# Patient Record
Sex: FEMALE | Race: WHITE | NOT HISPANIC OR LATINO
[De-identification: names, ages, dates, MRNs, and addresses within clinical notes are randomized per-mention and may not be internally consistent; named-entity substitution may affect disease eponyms.]

---

## 2017-02-28 ENCOUNTER — RESULT REVIEW (OUTPATIENT)
Age: 32
End: 2017-02-28

## 2017-03-01 ENCOUNTER — OUTPATIENT (OUTPATIENT)
Dept: OUTPATIENT SERVICES | Facility: HOSPITAL | Age: 32
LOS: 1 days | End: 2017-03-01
Payer: COMMERCIAL

## 2017-03-01 ENCOUNTER — APPOINTMENT (OUTPATIENT)
Dept: ULTRASOUND IMAGING | Facility: IMAGING CENTER | Age: 32
End: 2017-03-01

## 2017-03-01 DIAGNOSIS — R19.03 RIGHT LOWER QUADRANT ABDOMINAL SWELLING, MASS AND LUMP: ICD-10-CM

## 2017-03-09 ENCOUNTER — APPOINTMENT (OUTPATIENT)
Dept: MRI IMAGING | Facility: CLINIC | Age: 32
End: 2017-03-09

## 2017-03-09 ENCOUNTER — OUTPATIENT (OUTPATIENT)
Dept: OUTPATIENT SERVICES | Facility: HOSPITAL | Age: 32
LOS: 1 days | End: 2017-03-09
Payer: COMMERCIAL

## 2017-03-09 DIAGNOSIS — Z00.8 ENCOUNTER FOR OTHER GENERAL EXAMINATION: ICD-10-CM

## 2017-03-09 PROCEDURE — A9585: CPT

## 2017-03-09 PROCEDURE — 72197 MRI PELVIS W/O & W/DYE: CPT

## 2017-04-13 PROCEDURE — 76830 TRANSVAGINAL US NON-OB: CPT

## 2017-04-13 PROCEDURE — 76856 US EXAM PELVIC COMPLETE: CPT

## 2017-05-16 ENCOUNTER — NON-APPOINTMENT (OUTPATIENT)
Age: 32
End: 2017-05-16

## 2017-05-16 ENCOUNTER — APPOINTMENT (OUTPATIENT)
Dept: CARDIOLOGY | Facility: CLINIC | Age: 32
End: 2017-05-16

## 2017-05-16 VITALS
BODY MASS INDEX: 23.99 KG/M2 | SYSTOLIC BLOOD PRESSURE: 136 MMHG | HEART RATE: 106 BPM | OXYGEN SATURATION: 100 % | WEIGHT: 144 LBS | DIASTOLIC BLOOD PRESSURE: 84 MMHG | HEIGHT: 65 IN

## 2017-05-16 DIAGNOSIS — Z87.09 PERSONAL HISTORY OF OTHER DISEASES OF THE RESPIRATORY SYSTEM: ICD-10-CM

## 2017-05-31 ENCOUNTER — MEDICATION RENEWAL (OUTPATIENT)
Age: 32
End: 2017-05-31

## 2017-06-05 ENCOUNTER — OUTPATIENT (OUTPATIENT)
Dept: OUTPATIENT SERVICES | Facility: HOSPITAL | Age: 32
LOS: 1 days | End: 2017-06-05

## 2017-06-05 VITALS
RESPIRATION RATE: 16 BRPM | TEMPERATURE: 98 F | HEART RATE: 80 BPM | DIASTOLIC BLOOD PRESSURE: 78 MMHG | WEIGHT: 147.05 LBS | HEIGHT: 64.75 IN | SYSTOLIC BLOOD PRESSURE: 120 MMHG

## 2017-06-05 DIAGNOSIS — D25.9 LEIOMYOMA OF UTERUS, UNSPECIFIED: ICD-10-CM

## 2017-06-05 LAB
BLD GP AB SCN SERPL QL: NEGATIVE — SIGNIFICANT CHANGE UP
BUN SERPL-MCNC: 15 MG/DL — SIGNIFICANT CHANGE UP (ref 7–23)
CALCIUM SERPL-MCNC: 9.2 MG/DL — SIGNIFICANT CHANGE UP (ref 8.4–10.5)
CHLORIDE SERPL-SCNC: 104 MMOL/L — SIGNIFICANT CHANGE UP (ref 98–107)
CO2 SERPL-SCNC: 23 MMOL/L — SIGNIFICANT CHANGE UP (ref 22–31)
CREAT SERPL-MCNC: 0.55 MG/DL — SIGNIFICANT CHANGE UP (ref 0.5–1.3)
GLUCOSE SERPL-MCNC: 83 MG/DL — SIGNIFICANT CHANGE UP (ref 70–99)
HCT VFR BLD CALC: 43.3 % — SIGNIFICANT CHANGE UP (ref 34.5–45)
HGB BLD-MCNC: 14 G/DL — SIGNIFICANT CHANGE UP (ref 11.5–15.5)
MCHC RBC-ENTMCNC: 30.9 PG — SIGNIFICANT CHANGE UP (ref 27–34)
MCHC RBC-ENTMCNC: 32.3 % — SIGNIFICANT CHANGE UP (ref 32–36)
MCV RBC AUTO: 95.6 FL — SIGNIFICANT CHANGE UP (ref 80–100)
PLATELET # BLD AUTO: 275 K/UL — SIGNIFICANT CHANGE UP (ref 150–400)
PMV BLD: 10.4 FL — SIGNIFICANT CHANGE UP (ref 7–13)
POTASSIUM SERPL-MCNC: 4 MMOL/L — SIGNIFICANT CHANGE UP (ref 3.5–5.3)
POTASSIUM SERPL-SCNC: 4 MMOL/L — SIGNIFICANT CHANGE UP (ref 3.5–5.3)
RBC # BLD: 4.53 M/UL — SIGNIFICANT CHANGE UP (ref 3.8–5.2)
RBC # FLD: 12.8 % — SIGNIFICANT CHANGE UP (ref 10.3–14.5)
RH IG SCN BLD-IMP: NEGATIVE — SIGNIFICANT CHANGE UP
SODIUM SERPL-SCNC: 143 MMOL/L — SIGNIFICANT CHANGE UP (ref 135–145)
WBC # BLD: 6.46 K/UL — SIGNIFICANT CHANGE UP (ref 3.8–10.5)
WBC # FLD AUTO: 6.46 K/UL — SIGNIFICANT CHANGE UP (ref 3.8–10.5)

## 2017-06-05 RX ORDER — SODIUM CHLORIDE 9 MG/ML
1000 INJECTION, SOLUTION INTRAVENOUS
Qty: 0 | Refills: 0 | Status: DISCONTINUED | OUTPATIENT
Start: 2017-06-07 | End: 2017-06-08

## 2017-06-05 NOTE — H&P PST ADULT - MUSCULOSKELETAL
details… detailed exam no joint warmth/no joint swelling/ROM intact/normal strength/no calf tenderness/no joint erythema

## 2017-06-05 NOTE — H&P PST ADULT - NEGATIVE CARDIOVASCULAR SYMPTOMS
no orthopnea/no claudication/no dyspnea on exertion/no chest pain/no peripheral edema/no paroxysmal nocturnal dyspnea/no palpitations

## 2017-06-05 NOTE — H&P PST ADULT - LYMPHATIC
anterior cervical R/supraclavicular R/supraclavicular L/anterior cervical L/posterior cervical L/posterior cervical R

## 2017-06-05 NOTE — H&P PST ADULT - HISTORY OF PRESENT ILLNESS
32 yo female with no pertinent PMH presents to pre surgical testing.  Pt reports she went to GYN for a routine exam and uterine fibroid was palpated.  Pelvic sono and MRI done.  Pt is scheduled for exploratory laparotomy, abdominal myomectomy on 6/7/17. 32 yo female with no pertinent PMH presents to pre surgical testing.  Pt reports she went to GYN for a lower abdominal bloading and large uterine fibroid was palpated.  Pelvic sono and MRI done.  Pt is scheduled for exploratory laparotomy, abdominal myomectomy on 6/7/17. 30 yo female with no pertinent PMH presents to pre surgical testing.  Pt reports she went to GYN for a lower abdominal bloating and large uterine fibroid was palpated.  Pelvic sono and MRI done.  Pt is scheduled for exploratory laparotomy, abdominal myomectomy on 6/7/17.

## 2017-06-05 NOTE — H&P PST ADULT - NEGATIVE OPHTHALMOLOGIC SYMPTOMS
no blurred vision L/no lacrimation R/no lacrimation L/no pain L/no photophobia/no pain R/no diplopia

## 2017-06-05 NOTE — H&P PST ADULT - RS GEN PE MLT RESP DETAILS PC
no intercostal retractions/no subcutaneous emphysema/good air movement/breath sounds equal/no rhonchi/no rales/no chest wall tenderness/no wheezes/airway patent/respirations non-labored/clear to auscultation bilaterally

## 2017-06-05 NOTE — H&P PST ADULT - NSANTHOSAYNRD_GEN_A_CORE
No. SAMANTHA screening performed.  STOP BANG Legend: 0-2 = LOW Risk; 3-4 = INTERMEDIATE Risk; 5-8 = HIGH Risk

## 2017-06-05 NOTE — H&P PST ADULT - PROBLEM SELECTOR PLAN 1
Pt is scheduled for exploratory laparotomy, abdominal myomectomy on 6/7/17.   Pre op instructions including chlorhexidine wash given and pt able to verbalize understanding.   Sterile cup given, pt instructed to bring urine sample AM of surgery for UCG and pt able to verbalize understanding.

## 2017-06-05 NOTE — H&P PST ADULT - NEGATIVE MUSCULOSKELETAL SYMPTOMS
no myalgia/no arm pain R/no arthralgia/no muscle cramps/no neck pain/no stiffness/no leg pain R/no joint swelling/no back pain/no muscle weakness/no leg pain L/no arm pain L

## 2017-06-07 ENCOUNTER — INPATIENT (INPATIENT)
Facility: HOSPITAL | Age: 32
LOS: 1 days | Discharge: HOME | End: 2017-06-09
Attending: SPECIALIST | Admitting: SPECIALIST
Payer: COMMERCIAL

## 2017-06-07 ENCOUNTER — RESULT REVIEW (OUTPATIENT)
Age: 32
End: 2017-06-07

## 2017-06-07 VITALS
SYSTOLIC BLOOD PRESSURE: 127 MMHG | RESPIRATION RATE: 16 BRPM | DIASTOLIC BLOOD PRESSURE: 73 MMHG | HEART RATE: 84 BPM | TEMPERATURE: 99 F | HEIGHT: 64.75 IN | OXYGEN SATURATION: 99 % | WEIGHT: 147.05 LBS

## 2017-06-07 DIAGNOSIS — D25.9 LEIOMYOMA OF UTERUS, UNSPECIFIED: ICD-10-CM

## 2017-06-07 LAB
HCG UR-SCNC: NEGATIVE — SIGNIFICANT CHANGE UP
RH IG SCN BLD-IMP: NEGATIVE — SIGNIFICANT CHANGE UP
SP GR UR: 1.02 — SIGNIFICANT CHANGE UP (ref 1–1.03)

## 2017-06-07 PROCEDURE — 88305 TISSUE EXAM BY PATHOLOGIST: CPT | Mod: 26

## 2017-06-07 RX ORDER — OXYCODONE HYDROCHLORIDE 5 MG/1
5 TABLET ORAL
Qty: 0 | Refills: 0 | Status: DISCONTINUED | OUTPATIENT
Start: 2017-06-07 | End: 2017-06-09

## 2017-06-07 RX ORDER — ONDANSETRON 8 MG/1
4 TABLET, FILM COATED ORAL EVERY 4 HOURS
Qty: 0 | Refills: 0 | Status: DISCONTINUED | OUTPATIENT
Start: 2017-06-07 | End: 2017-06-09

## 2017-06-07 RX ORDER — IBUPROFEN 200 MG
600 TABLET ORAL EVERY 6 HOURS
Qty: 0 | Refills: 0 | Status: DISCONTINUED | OUTPATIENT
Start: 2017-06-07 | End: 2017-06-09

## 2017-06-07 RX ORDER — ONDANSETRON 8 MG/1
4 TABLET, FILM COATED ORAL ONCE
Qty: 0 | Refills: 0 | Status: DISCONTINUED | OUTPATIENT
Start: 2017-06-07 | End: 2017-06-09

## 2017-06-07 RX ORDER — SIMETHICONE 80 MG/1
80 TABLET, CHEWABLE ORAL
Qty: 0 | Refills: 0 | Status: DISCONTINUED | OUTPATIENT
Start: 2017-06-07 | End: 2017-06-09

## 2017-06-07 RX ORDER — FENTANYL CITRATE 50 UG/ML
50 INJECTION INTRAVENOUS
Qty: 0 | Refills: 0 | Status: DISCONTINUED | OUTPATIENT
Start: 2017-06-07 | End: 2017-06-09

## 2017-06-07 RX ORDER — OXYCODONE HYDROCHLORIDE 5 MG/1
5 TABLET ORAL EVERY 4 HOURS
Qty: 0 | Refills: 0 | Status: DISCONTINUED | OUTPATIENT
Start: 2017-06-07 | End: 2017-06-09

## 2017-06-07 RX ORDER — ACETAMINOPHEN 500 MG
975 TABLET ORAL EVERY 6 HOURS
Qty: 0 | Refills: 0 | Status: DISCONTINUED | OUTPATIENT
Start: 2017-06-07 | End: 2017-06-09

## 2017-06-07 RX ORDER — FENTANYL CITRATE 50 UG/ML
25 INJECTION INTRAVENOUS
Qty: 0 | Refills: 0 | Status: DISCONTINUED | OUTPATIENT
Start: 2017-06-07 | End: 2017-06-09

## 2017-06-07 RX ORDER — HYDROMORPHONE HYDROCHLORIDE 2 MG/ML
0.5 INJECTION INTRAMUSCULAR; INTRAVENOUS; SUBCUTANEOUS
Qty: 0 | Refills: 0 | Status: DISCONTINUED | OUTPATIENT
Start: 2017-06-07 | End: 2017-06-09

## 2017-06-07 RX ORDER — SODIUM CHLORIDE 9 MG/ML
1000 INJECTION, SOLUTION INTRAVENOUS
Qty: 0 | Refills: 0 | Status: DISCONTINUED | OUTPATIENT
Start: 2017-06-07 | End: 2017-06-08

## 2017-06-07 RX ADMIN — HYDROMORPHONE HYDROCHLORIDE 0.5 MILLIGRAM(S): 2 INJECTION INTRAMUSCULAR; INTRAVENOUS; SUBCUTANEOUS at 19:00

## 2017-06-07 RX ADMIN — SODIUM CHLORIDE 125 MILLILITER(S): 9 INJECTION, SOLUTION INTRAVENOUS at 19:30

## 2017-06-07 RX ADMIN — SODIUM CHLORIDE 30 MILLILITER(S): 9 INJECTION, SOLUTION INTRAVENOUS at 15:13

## 2017-06-07 RX ADMIN — HYDROMORPHONE HYDROCHLORIDE 0.5 MILLIGRAM(S): 2 INJECTION INTRAMUSCULAR; INTRAVENOUS; SUBCUTANEOUS at 19:15

## 2017-06-07 RX ADMIN — HYDROMORPHONE HYDROCHLORIDE 0.5 MILLIGRAM(S): 2 INJECTION INTRAMUSCULAR; INTRAVENOUS; SUBCUTANEOUS at 19:17

## 2017-06-07 RX ADMIN — OXYCODONE HYDROCHLORIDE 5 MILLIGRAM(S): 5 TABLET ORAL at 20:05

## 2017-06-07 RX ADMIN — Medication 600 MILLIGRAM(S): at 23:20

## 2017-06-07 RX ADMIN — OXYCODONE HYDROCHLORIDE 5 MILLIGRAM(S): 5 TABLET ORAL at 20:35

## 2017-06-07 RX ADMIN — Medication 600 MILLIGRAM(S): at 22:24

## 2017-06-07 RX ADMIN — HYDROMORPHONE HYDROCHLORIDE 0.5 MILLIGRAM(S): 2 INJECTION INTRAMUSCULAR; INTRAVENOUS; SUBCUTANEOUS at 18:59

## 2017-06-07 RX ADMIN — HYDROMORPHONE HYDROCHLORIDE 0.5 MILLIGRAM(S): 2 INJECTION INTRAMUSCULAR; INTRAVENOUS; SUBCUTANEOUS at 18:46

## 2017-06-07 RX ADMIN — HYDROMORPHONE HYDROCHLORIDE 0.5 MILLIGRAM(S): 2 INJECTION INTRAMUSCULAR; INTRAVENOUS; SUBCUTANEOUS at 19:30

## 2017-06-07 NOTE — BRIEF OPERATIVE NOTE - OPERATION/FINDINGS
15 cm pedunculated fundal myoma, 2cm submucosal fundal myoma. grossly normal fallopian tubes and ovaries.

## 2017-06-08 ENCOUNTER — TRANSCRIPTION ENCOUNTER (OUTPATIENT)
Age: 32
End: 2017-06-08

## 2017-06-08 LAB
BASOPHILS # BLD AUTO: 0.01 K/UL — SIGNIFICANT CHANGE UP (ref 0–0.2)
BASOPHILS NFR BLD AUTO: 0.1 % — SIGNIFICANT CHANGE UP (ref 0–2)
EOSINOPHIL # BLD AUTO: 0 K/UL — SIGNIFICANT CHANGE UP (ref 0–0.5)
EOSINOPHIL NFR BLD AUTO: 0 % — SIGNIFICANT CHANGE UP (ref 0–6)
HCT VFR BLD CALC: 40 % — SIGNIFICANT CHANGE UP (ref 34.5–45)
HGB BLD-MCNC: 12.6 G/DL — SIGNIFICANT CHANGE UP (ref 11.5–15.5)
IMM GRANULOCYTES NFR BLD AUTO: 0.3 % — SIGNIFICANT CHANGE UP (ref 0–1.5)
LYMPHOCYTES # BLD AUTO: 1.23 K/UL — SIGNIFICANT CHANGE UP (ref 1–3.3)
LYMPHOCYTES # BLD AUTO: 10.5 % — LOW (ref 13–44)
MCHC RBC-ENTMCNC: 30.2 PG — SIGNIFICANT CHANGE UP (ref 27–34)
MCHC RBC-ENTMCNC: 31.5 % — LOW (ref 32–36)
MCV RBC AUTO: 95.9 FL — SIGNIFICANT CHANGE UP (ref 80–100)
MONOCYTES # BLD AUTO: 0.78 K/UL — SIGNIFICANT CHANGE UP (ref 0–0.9)
MONOCYTES NFR BLD AUTO: 6.6 % — SIGNIFICANT CHANGE UP (ref 2–14)
NEUTROPHILS # BLD AUTO: 9.69 K/UL — HIGH (ref 1.8–7.4)
NEUTROPHILS NFR BLD AUTO: 82.5 % — HIGH (ref 43–77)
PLATELET # BLD AUTO: 238 K/UL — SIGNIFICANT CHANGE UP (ref 150–400)
PMV BLD: 10.1 FL — SIGNIFICANT CHANGE UP (ref 7–13)
RBC # BLD: 4.17 M/UL — SIGNIFICANT CHANGE UP (ref 3.8–5.2)
RBC # FLD: 12.7 % — SIGNIFICANT CHANGE UP (ref 10.3–14.5)
WBC # BLD: 11.74 K/UL — HIGH (ref 3.8–10.5)
WBC # FLD AUTO: 11.74 K/UL — HIGH (ref 3.8–10.5)

## 2017-06-08 RX ADMIN — OXYCODONE HYDROCHLORIDE 5 MILLIGRAM(S): 5 TABLET ORAL at 06:27

## 2017-06-08 RX ADMIN — Medication 600 MILLIGRAM(S): at 13:23

## 2017-06-08 RX ADMIN — Medication 975 MILLIGRAM(S): at 17:05

## 2017-06-08 RX ADMIN — Medication 600 MILLIGRAM(S): at 23:00

## 2017-06-08 RX ADMIN — Medication 600 MILLIGRAM(S): at 17:35

## 2017-06-08 RX ADMIN — OXYCODONE HYDROCHLORIDE 5 MILLIGRAM(S): 5 TABLET ORAL at 00:00

## 2017-06-08 RX ADMIN — SIMETHICONE 80 MILLIGRAM(S): 80 TABLET, CHEWABLE ORAL at 00:12

## 2017-06-08 RX ADMIN — Medication 600 MILLIGRAM(S): at 04:30

## 2017-06-08 RX ADMIN — SIMETHICONE 80 MILLIGRAM(S): 80 TABLET, CHEWABLE ORAL at 22:33

## 2017-06-08 RX ADMIN — SIMETHICONE 80 MILLIGRAM(S): 80 TABLET, CHEWABLE ORAL at 04:20

## 2017-06-08 RX ADMIN — OXYCODONE HYDROCHLORIDE 5 MILLIGRAM(S): 5 TABLET ORAL at 04:30

## 2017-06-08 RX ADMIN — SODIUM CHLORIDE 125 MILLILITER(S): 9 INJECTION, SOLUTION INTRAVENOUS at 00:12

## 2017-06-08 RX ADMIN — SIMETHICONE 80 MILLIGRAM(S): 80 TABLET, CHEWABLE ORAL at 19:14

## 2017-06-08 RX ADMIN — Medication 600 MILLIGRAM(S): at 03:38

## 2017-06-08 RX ADMIN — Medication 600 MILLIGRAM(S): at 22:36

## 2017-06-08 RX ADMIN — Medication 975 MILLIGRAM(S): at 07:20

## 2017-06-08 RX ADMIN — Medication 975 MILLIGRAM(S): at 00:02

## 2017-06-08 RX ADMIN — Medication 600 MILLIGRAM(S): at 12:53

## 2017-06-08 RX ADMIN — OXYCODONE HYDROCHLORIDE 5 MILLIGRAM(S): 5 TABLET ORAL at 09:21

## 2017-06-08 RX ADMIN — Medication 975 MILLIGRAM(S): at 22:36

## 2017-06-08 RX ADMIN — Medication 975 MILLIGRAM(S): at 01:00

## 2017-06-08 RX ADMIN — Medication 975 MILLIGRAM(S): at 23:00

## 2017-06-08 RX ADMIN — Medication 975 MILLIGRAM(S): at 12:53

## 2017-06-08 RX ADMIN — Medication 975 MILLIGRAM(S): at 17:35

## 2017-06-08 RX ADMIN — OXYCODONE HYDROCHLORIDE 5 MILLIGRAM(S): 5 TABLET ORAL at 03:38

## 2017-06-08 RX ADMIN — SIMETHICONE 80 MILLIGRAM(S): 80 TABLET, CHEWABLE ORAL at 12:02

## 2017-06-08 RX ADMIN — Medication 975 MILLIGRAM(S): at 06:27

## 2017-06-08 RX ADMIN — OXYCODONE HYDROCHLORIDE 5 MILLIGRAM(S): 5 TABLET ORAL at 00:02

## 2017-06-08 RX ADMIN — OXYCODONE HYDROCHLORIDE 5 MILLIGRAM(S): 5 TABLET ORAL at 07:20

## 2017-06-08 RX ADMIN — OXYCODONE HYDROCHLORIDE 5 MILLIGRAM(S): 5 TABLET ORAL at 01:00

## 2017-06-08 RX ADMIN — Medication 600 MILLIGRAM(S): at 17:06

## 2017-06-08 RX ADMIN — Medication 975 MILLIGRAM(S): at 13:23

## 2017-06-08 NOTE — PROGRESS NOTE ADULT - SUBJECTIVE AND OBJECTIVE BOX
Pt seen and examined at bedside. Pt states mild abdominal pain. Pt [] ambulating, tolerating regular diet, [+] flatus, [0]BM, [+] urinating adequately.   Pt denies fever, chills, chest pain, SOB, nausea, vomiting, lightheadedness, dizziness.      T(F): 98, Max: 98.6 (06-07 @ 14:51)  HR: 62 (62 - 113)  BP: 118/69 (101/76 - 133/89)  RR: 16 (12 - 18)  SpO2: 99% (97% - 99%)  Wt(kg): --  I&O's Summary  I & Os for 24h ending 08 Jun 2017 07:00  =============================================  IN: 1495 ml / OUT: 2425 ml / NET: -930 ml    I & Os for current day (as of 08 Jun 2017 10:00)  =============================================  IN: 0 ml / OUT: 250 ml / NET: -250 ml      MEDICATIONS  (STANDING):  acetaminophen   Tablet. 975milliGRAM(s) Oral every 6 hours  ibuprofen  Tablet 600milliGRAM(s) Oral every 6 hours  oxyCODONE IR 5milliGRAM(s) Oral every 3 hours    MEDICATIONS  (PRN):  oxyCODONE IR 5milliGRAM(s) Oral every 4 hours PRN Severe Pain (7 - 10)  simethicone 80milliGRAM(s) Chew four times a day PRN Gas  ondansetron Injectable 4milliGRAM(s) IV Push every 4 hours PRN Nausea and/or Vomiting  fentaNYL    Injectable 25MICROGram(s) IV Push every 5 minutes PRN Mild Pain (1 - 3)  fentaNYL    Injectable 50MICROGram(s) IV Push every 5 minutes PRN Moderate Pain (4 - 6)  HYDROmorphone  Injectable 0.5milliGRAM(s) IV Push every 10 minutes PRN Severe Pain (7 - 10)  ondansetron Injectable 4milliGRAM(s) IV Push once PRN Nausea and/or Vomiting      Physical Exam:  Constitutional: NAD  Pulmonary: clear to auscultation bilaterally   Cardiovascular: Regular rate and rhythm   Abdomen: incision site clean, dry, intact. Soft, mildly tender, [] distended, no guarding, no rebound, [] bowel sounds  Extremities: no lower extremity edema or calve tenderness. SCDs in place     LABS:                        12.6   11.74 )-----------( 238      ( 08 Jun 2017 04:10 )             40.0                 RADIOLOGY & ADDITIONAL TESTS:

## 2017-06-08 NOTE — PROGRESS NOTE ADULT - SUBJECTIVE AND OBJECTIVE BOX
POD#1   HD#2    Patient seen and examined at bedside, no acute overnight events. No acute complaints, pain well controlled.  Patient has not ambulated and is tolerating clears. Has not yet passed flatus. Carrion in place overnight. Denies CP, SOB, N/V, fevers, and chills.    Vital Signs Last 24 Hours  T(C): 36.7, Max: 37 (06-07 @ 14:51)  HR: 62 (62 - 113)  BP: 118/69 (101/76 - 133/89)  RR: 16 (12 - 18)  SpO2: 99% (97% - 99%)  Wt(kg): --    I&O's Summary    I & Os for current day (as of 08 Jun 2017 06:35)  =============================================  IN: 1495 ml / OUT: 2425 ml / NET: -930 ml      Physical Exam:  General: NAD  CV: RRR, S1, S2, no M/R/G  Lungs: CTAB  Abdomen: Soft, mildly tender, non-distended, +BS  Incision: midline vertical incision C/D/I  Ext: NTBL    Labs:                        12.6   11.74 )-----------( 238      ( 08 Jun 2017 04:10 )             40.0   baso 0.1    eos 0.0    imm gran 0.3    lymph 10.5   mono 6.6    poly 82.5                         14.0   6.46  )-----------( 275      ( 05 Jun 2017 07:20 )             43.3   baso x      eos x      imm gran x      lymph x      mono x      poly x          MEDICATIONS  (STANDING):  acetaminophen   Tablet. 975milliGRAM(s) Oral every 6 hours  ibuprofen  Tablet 600milliGRAM(s) Oral every 6 hours  oxyCODONE IR 5milliGRAM(s) Oral every 3 hours    MEDICATIONS  (PRN):  oxyCODONE IR 5milliGRAM(s) Oral every 4 hours PRN Severe Pain (7 - 10)  simethicone 80milliGRAM(s) Chew four times a day PRN Gas  ondansetron Injectable 4milliGRAM(s) IV Push every 4 hours PRN Nausea and/or Vomiting  fentaNYL    Injectable 25MICROGram(s) IV Push every 5 minutes PRN Mild Pain (1 - 3)  fentaNYL    Injectable 50MICROGram(s) IV Push every 5 minutes PRN Moderate Pain (4 - 6)  HYDROmorphone  Injectable 0.5milliGRAM(s) IV Push every 10 minutes PRN Severe Pain (7 - 10)  ondansetron Injectable 4milliGRAM(s) IV Push once PRN Nausea and/or Vomiting

## 2017-06-09 VITALS
SYSTOLIC BLOOD PRESSURE: 122 MMHG | RESPIRATION RATE: 18 BRPM | TEMPERATURE: 98 F | HEART RATE: 100 BPM | DIASTOLIC BLOOD PRESSURE: 66 MMHG | OXYGEN SATURATION: 99 %

## 2017-06-09 LAB
BASOPHILS # BLD AUTO: 0.01 K/UL — SIGNIFICANT CHANGE UP (ref 0–0.2)
BASOPHILS NFR BLD AUTO: 0.1 % — SIGNIFICANT CHANGE UP (ref 0–2)
EOSINOPHIL # BLD AUTO: 0.14 K/UL — SIGNIFICANT CHANGE UP (ref 0–0.5)
EOSINOPHIL NFR BLD AUTO: 1.8 % — SIGNIFICANT CHANGE UP (ref 0–6)
HCT VFR BLD CALC: 36.4 % — SIGNIFICANT CHANGE UP (ref 34.5–45)
HGB BLD-MCNC: 11.7 G/DL — SIGNIFICANT CHANGE UP (ref 11.5–15.5)
IMM GRANULOCYTES NFR BLD AUTO: 0.1 % — SIGNIFICANT CHANGE UP (ref 0–1.5)
LYMPHOCYTES # BLD AUTO: 2.04 K/UL — SIGNIFICANT CHANGE UP (ref 1–3.3)
LYMPHOCYTES # BLD AUTO: 26.8 % — SIGNIFICANT CHANGE UP (ref 13–44)
MCHC RBC-ENTMCNC: 31 PG — SIGNIFICANT CHANGE UP (ref 27–34)
MCHC RBC-ENTMCNC: 32.1 % — SIGNIFICANT CHANGE UP (ref 32–36)
MCV RBC AUTO: 96.6 FL — SIGNIFICANT CHANGE UP (ref 80–100)
MONOCYTES # BLD AUTO: 0.56 K/UL — SIGNIFICANT CHANGE UP (ref 0–0.9)
MONOCYTES NFR BLD AUTO: 7.4 % — SIGNIFICANT CHANGE UP (ref 2–14)
NEUTROPHILS # BLD AUTO: 4.84 K/UL — SIGNIFICANT CHANGE UP (ref 1.8–7.4)
NEUTROPHILS NFR BLD AUTO: 63.8 % — SIGNIFICANT CHANGE UP (ref 43–77)
PLATELET # BLD AUTO: 198 K/UL — SIGNIFICANT CHANGE UP (ref 150–400)
PMV BLD: 10 FL — SIGNIFICANT CHANGE UP (ref 7–13)
RBC # BLD: 3.77 M/UL — LOW (ref 3.8–5.2)
RBC # FLD: 12.8 % — SIGNIFICANT CHANGE UP (ref 10.3–14.5)
WBC # BLD: 7.6 K/UL — SIGNIFICANT CHANGE UP (ref 3.8–10.5)
WBC # FLD AUTO: 7.6 K/UL — SIGNIFICANT CHANGE UP (ref 3.8–10.5)

## 2017-06-09 RX ORDER — SENNA PLUS 8.6 MG/1
2 TABLET ORAL AT BEDTIME
Qty: 0 | Refills: 0 | Status: DISCONTINUED | OUTPATIENT
Start: 2017-06-09 | End: 2017-06-09

## 2017-06-09 RX ORDER — POLYETHYLENE GLYCOL 3350 17 G/17G
17 POWDER, FOR SOLUTION ORAL THREE TIMES A DAY
Qty: 0 | Refills: 0 | Status: DISCONTINUED | OUTPATIENT
Start: 2017-06-09 | End: 2017-06-09

## 2017-06-09 RX ORDER — OXYCODONE HYDROCHLORIDE 5 MG/1
1 TABLET ORAL
Qty: 30 | Refills: 0 | OUTPATIENT
Start: 2017-06-09

## 2017-06-09 RX ORDER — DOCUSATE SODIUM 100 MG
100 CAPSULE ORAL THREE TIMES A DAY
Qty: 0 | Refills: 0 | Status: DISCONTINUED | OUTPATIENT
Start: 2017-06-09 | End: 2017-06-09

## 2017-06-09 RX ADMIN — SIMETHICONE 80 MILLIGRAM(S): 80 TABLET, CHEWABLE ORAL at 12:25

## 2017-06-09 RX ADMIN — OXYCODONE HYDROCHLORIDE 5 MILLIGRAM(S): 5 TABLET ORAL at 06:37

## 2017-06-09 RX ADMIN — OXYCODONE HYDROCHLORIDE 5 MILLIGRAM(S): 5 TABLET ORAL at 16:25

## 2017-06-09 RX ADMIN — POLYETHYLENE GLYCOL 3350 17 GRAM(S): 17 POWDER, FOR SOLUTION ORAL at 14:57

## 2017-06-09 RX ADMIN — OXYCODONE HYDROCHLORIDE 5 MILLIGRAM(S): 5 TABLET ORAL at 06:53

## 2017-06-09 RX ADMIN — SIMETHICONE 80 MILLIGRAM(S): 80 TABLET, CHEWABLE ORAL at 05:26

## 2017-06-09 RX ADMIN — OXYCODONE HYDROCHLORIDE 5 MILLIGRAM(S): 5 TABLET ORAL at 12:25

## 2017-06-09 RX ADMIN — OXYCODONE HYDROCHLORIDE 5 MILLIGRAM(S): 5 TABLET ORAL at 06:38

## 2017-06-09 RX ADMIN — OXYCODONE HYDROCHLORIDE 5 MILLIGRAM(S): 5 TABLET ORAL at 15:55

## 2017-06-09 RX ADMIN — Medication 975 MILLIGRAM(S): at 05:26

## 2017-06-09 RX ADMIN — Medication 975 MILLIGRAM(S): at 06:34

## 2017-06-09 RX ADMIN — OXYCODONE HYDROCHLORIDE 5 MILLIGRAM(S): 5 TABLET ORAL at 10:19

## 2017-06-09 RX ADMIN — OXYCODONE HYDROCHLORIDE 5 MILLIGRAM(S): 5 TABLET ORAL at 12:55

## 2017-06-09 RX ADMIN — Medication 600 MILLIGRAM(S): at 06:34

## 2017-06-09 RX ADMIN — Medication 600 MILLIGRAM(S): at 05:26

## 2017-06-09 RX ADMIN — Medication 100 MILLIGRAM(S): at 14:57

## 2017-06-09 RX ADMIN — OXYCODONE HYDROCHLORIDE 5 MILLIGRAM(S): 5 TABLET ORAL at 09:49

## 2017-06-09 NOTE — PROGRESS NOTE ADULT - PROBLEM SELECTOR PLAN 1
Neuro: po pain meds for analgesia  CV: Hemodynamically stable. VS wnl.   Pulm: Saturating well on room air, encourage incentive spirometry and ambulation  GI: will advance to regular diet when patient passes flatus. continue clear liquid diet. mylicon for gas pain.   : UOP adequate, patterson removed at bedside, pt will be due to void  Heme: Of out bed and SCDs for DVT ppx. daily CBC. monitor for signs of bleeding or anemia.  ID: afebrile. trend WBC. monitor for signs/symptoms of infection.  Dispo: Continue routine post-op care
Neuro: po pain meds for analgesia.  CV: Hemodynamically stable. VS wnl.   Pulm: Saturating well on room air, encourage incentive spirometry and ambulation  GI: regular diet as tolerated. mylicon for gas pain. will add colace and senna.  : UOP adequate, pt freely voiding  Heme: Of out bed and SCDs for DVT ppx. daily CBC. monitor for signs of bleeding or anemia.  ID: afebrile. trend WBC. monitor for signs/symptoms of infection.  Dispo: Continue routine post-op care. discharge planning

## 2017-06-09 NOTE — DISCHARGE NOTE ADULT - MEDICATION SUMMARY - MEDICATIONS TO TAKE
I will START or STAY ON the medications listed below when I get home from the hospital:    oxyCODONE 5 mg oral tablet  -- 1 tab(s) by mouth every 6 hours, As Needed -for severe pain MDD:4  -- Caution federal law prohibits the transfer of this drug to any person other  than the person for whom it was prescribed.  It is very important that you take or use this exactly as directed.  Do not skip doses or discontinue unless directed by your doctor.  May cause drowsiness.  Alcohol may intensify this effect.  Use care when operating dangerous machinery.  This prescription cannot be refilled.  Using more of this medication than prescribed may cause serious breathing problems.    -- Indication: For pain I will START or STAY ON the medications listed below when I get home from the hospital:    oxyCODONE 5 mg oral tablet  -- 1 tab(s) by mouth every 6 hours  -- Indication: For Pain

## 2017-06-09 NOTE — DISCHARGE NOTE ADULT - MEDICATION SUMMARY - MEDICATIONS TO STOP TAKING
I will STOP taking the medications listed below when I get home from the hospital:    oxyCODONE 5 mg oral tablet  -- 1 tab(s) by mouth every 6 hours, As Needed -for severe pain MDD:4  -- Caution federal law prohibits the transfer of this drug to any person other  than the person for whom it was prescribed.  It is very important that you take or use this exactly as directed.  Do not skip doses or discontinue unless directed by your doctor.  May cause drowsiness.  Alcohol may intensify this effect.  Use care when operating dangerous machinery.  This prescription cannot be refilled.  Using more of this medication than prescribed may cause serious breathing problems. I will STOP taking the medications listed below when I get home from the hospital:  None

## 2017-06-09 NOTE — DISCHARGE NOTE ADULT - CARE PROVIDER_API CALL
Gabriel Henry (MD), Obstetrics and Gynecology  [address] [place] [address]  Phone: [phone]  Fax: [phone]

## 2017-06-09 NOTE — DISCHARGE NOTE ADULT - CARE PLAN
Principal Discharge DX:	Leiomyoma of uterus  Goal:	recovery  Instructions for follow-up, activity and diet:	nothing in vagina (sex, tampons, douching) no heavy lifting (>10 lbs) for 6 weeks. Please return to ER or call your doctors office if pain is worsening, you are unable to keep down food or drink, fever >101, heavy vaginal bleeding. You are able to take a shower regularly.

## 2017-06-09 NOTE — DISCHARGE NOTE ADULT - PATIENT PORTAL LINK FT
“You can access the [facility] Patient Portal, offered by [facility], by registering with the following website: [email or web address]”

## 2017-06-09 NOTE — PROGRESS NOTE ADULT - SUBJECTIVE AND OBJECTIVE BOX
GYN NOTE  POD#1  Patient seen resting comfortably.  No current complaints.  Denies HA, CP, SOB, dizziness, palpitations, worsening abdominal pain, worsening vaginal bleeding or any other concerns.  + Ambulation, + void without difficulty, + flatus and tolerating regular diet.     Vital Signs Last 24 Hrs  T(C): 36.3, Max: 36.8 (06-08 @ 17:59)  T(F): 97.3, Max: 98.2 (06-08 @ 17:59)  HR: 79 (71 - 92)  BP: 113/67 (94/54 - 120/70)  BP(mean): --  RR: 17 (16 - 18)  SpO2: 99% (98% - 100%)  CAPILLARY BLOOD GLUCOSE      Gen: A&O x3, NAD  Abdomen: Soft, nontender, +BS, nondistended, incisions clean/dry/intact  Gyn: No active bleeding  Extremities: Nontender, no worsening edema, venodynes intact bilaterally    CBC Full  -  ( 09 Jun 2017 04:40 )  WBC Count : 7.60 K/uL  Hemoglobin : 11.7 g/dL  Hematocrit : 36.4 %  Platelet Count - Automated : 198 K/uL  Mean Cell Volume : 96.6 fL  Mean Cell Hemoglobin : 31.0 pg  Mean Cell Hemoglobin Concentration : 32.1 %  Auto Neutrophil # : 4.84 K/uL  Auto Lymphocyte # : 2.04 K/uL  Auto Monocyte # : 0.56 K/uL  Auto Eosinophil # : 0.14 K/uL  Auto Basophil # : 0.01 K/uL  Auto Neutrophil % : 63.8 %  Auto Lymphocyte % : 26.8 %  Auto Monocyte % : 7.4 %  Auto Eosinophil % : 1.8 %  Auto Basophil % : 0.1 %              A/P: POD#2  -MEDICATIONS  (STANDING):  acetaminophen   Tablet. 975milliGRAM(s) Oral every 6 hours  ibuprofen  Tablet 600milliGRAM(s) Oral every 6 hours  oxyCODONE IR 5milliGRAM(s) Oral every 3 hours  docusate sodium 100milliGRAM(s) Oral three times a day  senna 2Tablet(s) Oral at bedtime    - Pain management prn  - Advance diet as per protocol  -OOB and ambulate    D/c home this pm  advised

## 2017-06-09 NOTE — DISCHARGE NOTE ADULT - HOSPITAL COURSE
30 y/o s/p Abdominal Myomectomy   2/2  Fibroids   EBL:50    HCT 43.3->40->36.4    .  See operative note for details of procedure.  POD#0, patient was advanced to a regular diet.  POD#1, patterson was discontinued and patient voided spontaneously.  Patient was discharged on POD#2 in stable condition with adequate pain control, ambulating, tolerating PO and voiding spontaneously.  Patient to follow up with Dr. Henry   in 2 weeks.

## 2017-06-09 NOTE — DISCHARGE NOTE ADULT - PLAN OF CARE
recovery nothing in vagina (sex, tampons, douching) no heavy lifting (>10 lbs) for 6 weeks. Please return to ER or call your doctors office if pain is worsening, you are unable to keep down food or drink, fever >101, heavy vaginal bleeding. You are able to take a shower regularly.

## 2017-06-09 NOTE — PROGRESS NOTE ADULT - SUBJECTIVE AND OBJECTIVE BOX
POD#2   HD#3    Patient seen and examined at bedside, no acute overnight events. Reporting gas pain this AM. Pt has been declining oxycodone although reports pain overnight.  Patient is ambulating and tolerating reg diet. +flatus. Freely voiding. Denies CP, SOB, N/V, fevers, and chills.    Vital Signs Last 24 Hours  T(C): 36.3, Max: 36.8 (06-08 @ 17:59)  HR: 79 (71 - 92)  BP: 113/67 (94/54 - 120/70)  RR: 17 (16 - 18)  SpO2: 99% (98% - 100%)  Wt(kg): --    I&O's Summary  I & Os for 24h ending 08 Jun 2017 07:00  =============================================  IN: 1495 ml / OUT: 2425 ml / NET: -930 ml    I & Os for current day (as of 09 Jun 2017 06:57)  =============================================  IN: 0 ml / OUT: 1450 ml / NET: -1450 ml      Physical Exam:  General: NAD  CV: RRR, S1, S2, no M/R/G  Lungs: CTAB  Abdomen: Soft, mildly tender, mildly distended, +BS  Incision: C/D/I midline vertical incision  Ext: NTBL    Labs:                        11.7   7.60  )-----------( 198      ( 09 Jun 2017 04:40 )             36.4   baso 0.1    eos 1.8    imm gran 0.1    lymph 26.8   mono 7.4    poly 63.8                         12.6   11.74 )-----------( 238      ( 08 Jun 2017 04:10 )             40.0   baso 0.1    eos 0.0    imm gran 0.3    lymph 10.5   mono 6.6    poly 82.5       MEDICATIONS  (STANDING):  acetaminophen   Tablet. 975milliGRAM(s) Oral every 6 hours  ibuprofen  Tablet 600milliGRAM(s) Oral every 6 hours  oxyCODONE IR 5milliGRAM(s) Oral every 3 hours    MEDICATIONS  (PRN):  oxyCODONE IR 5milliGRAM(s) Oral every 4 hours PRN Severe Pain (7 - 10)  simethicone 80milliGRAM(s) Chew four times a day PRN Gas  ondansetron Injectable 4milliGRAM(s) IV Push every 4 hours PRN Nausea and/or Vomiting  fentaNYL    Injectable 25MICROGram(s) IV Push every 5 minutes PRN Mild Pain (1 - 3)  fentaNYL    Injectable 50MICROGram(s) IV Push every 5 minutes PRN Moderate Pain (4 - 6)  HYDROmorphone  Injectable 0.5milliGRAM(s) IV Push every 10 minutes PRN Severe Pain (7 - 10)  ondansetron Injectable 4milliGRAM(s) IV Push once PRN Nausea and/or Vomiting

## 2017-06-15 LAB — SURGICAL PATHOLOGY STUDY: SIGNIFICANT CHANGE UP

## 2017-07-28 RX ORDER — ACETAMINOPHEN 500 MG
2 TABLET ORAL
Qty: 0 | Refills: 0 | COMMUNITY

## 2017-07-28 RX ORDER — OXYCODONE HYDROCHLORIDE 5 MG/1
1 TABLET ORAL
Qty: 0 | Refills: 0 | COMMUNITY

## 2017-07-28 RX ORDER — IBUPROFEN 200 MG
1 TABLET ORAL
Qty: 0 | Refills: 0 | COMMUNITY

## 2017-07-29 ENCOUNTER — RX RENEWAL (OUTPATIENT)
Age: 32
End: 2017-07-29

## 2017-08-01 ENCOUNTER — MEDICATION RENEWAL (OUTPATIENT)
Age: 32
End: 2017-08-01

## 2017-08-01 RX ORDER — ADHESIVE TAPE 3"X 2.3 YD
50 MCG TAPE, NON-MEDICATED TOPICAL
Qty: 90 | Refills: 3 | Status: ACTIVE | COMMUNITY
Start: 2017-08-01 | End: 1900-01-01

## 2018-03-02 ENCOUNTER — APPOINTMENT (OUTPATIENT)
Dept: ANTEPARTUM | Facility: CLINIC | Age: 33
End: 2018-03-02

## 2018-07-01 ENCOUNTER — OUTPATIENT (OUTPATIENT)
Dept: OUTPATIENT SERVICES | Facility: HOSPITAL | Age: 33
LOS: 1 days | End: 2018-07-01

## 2018-07-01 DIAGNOSIS — Z3A.00 WEEKS OF GESTATION OF PREGNANCY NOT SPECIFIED: ICD-10-CM

## 2018-07-01 DIAGNOSIS — O26.899 OTHER SPECIFIED PREGNANCY RELATED CONDITIONS, UNSPECIFIED TRIMESTER: ICD-10-CM

## 2018-07-02 RX ORDER — SODIUM CHLORIDE 9 MG/ML
1000 INJECTION, SOLUTION INTRAVENOUS
Qty: 0 | Refills: 0 | Status: DISCONTINUED | OUTPATIENT
Start: 2018-07-02 | End: 2018-07-16

## 2018-07-15 ENCOUNTER — TRANSCRIPTION ENCOUNTER (OUTPATIENT)
Age: 33
End: 2018-07-15

## 2018-07-16 ENCOUNTER — INPATIENT (INPATIENT)
Facility: HOSPITAL | Age: 33
LOS: 2 days | Discharge: HOME | End: 2018-07-19
Attending: SPECIALIST | Admitting: SPECIALIST

## 2018-07-16 ENCOUNTER — TRANSCRIPTION ENCOUNTER (OUTPATIENT)
Age: 33
End: 2018-07-16

## 2018-07-16 VITALS — HEIGHT: 65 IN | WEIGHT: 178.57 LBS

## 2018-07-16 DIAGNOSIS — Z3A.00 WEEKS OF GESTATION OF PREGNANCY NOT SPECIFIED: ICD-10-CM

## 2018-07-16 DIAGNOSIS — O42.10 PREMATURE RUPTURE OF MEMBRANES, ONSET OF LABOR MORE THAN 24 HOURS FOLLOWING RUPTURE, UNSPECIFIED WEEKS OF GESTATION: ICD-10-CM

## 2018-07-16 PROBLEM — D25.9 LEIOMYOMA OF UTERUS, UNSPECIFIED: Chronic | Status: ACTIVE | Noted: 2017-06-05

## 2018-07-16 LAB
BASOPHILS # BLD AUTO: 0.02 K/UL — SIGNIFICANT CHANGE UP (ref 0–0.2)
BASOPHILS NFR BLD AUTO: 0.2 % — SIGNIFICANT CHANGE UP (ref 0–2)
BLD GP AB SCN SERPL QL: NEGATIVE — SIGNIFICANT CHANGE UP
EOSINOPHIL # BLD AUTO: 0.04 K/UL — SIGNIFICANT CHANGE UP (ref 0–0.5)
EOSINOPHIL NFR BLD AUTO: 0.4 % — SIGNIFICANT CHANGE UP (ref 0–6)
HCT VFR BLD CALC: 36.7 % — SIGNIFICANT CHANGE UP (ref 34.5–45)
HGB BLD-MCNC: 12.3 G/DL — SIGNIFICANT CHANGE UP (ref 11.5–15.5)
IMM GRANULOCYTES # BLD AUTO: 0.07 # — SIGNIFICANT CHANGE UP
IMM GRANULOCYTES NFR BLD AUTO: 0.6 % — SIGNIFICANT CHANGE UP (ref 0–1.5)
LYMPHOCYTES # BLD AUTO: 1.24 K/UL — SIGNIFICANT CHANGE UP (ref 1–3.3)
LYMPHOCYTES # BLD AUTO: 11.2 % — LOW (ref 13–44)
MCHC RBC-ENTMCNC: 31.7 PG — SIGNIFICANT CHANGE UP (ref 27–34)
MCHC RBC-ENTMCNC: 33.5 % — SIGNIFICANT CHANGE UP (ref 32–36)
MCV RBC AUTO: 94.6 FL — SIGNIFICANT CHANGE UP (ref 80–100)
MONOCYTES # BLD AUTO: 0.82 K/UL — SIGNIFICANT CHANGE UP (ref 0–0.9)
MONOCYTES NFR BLD AUTO: 7.4 % — SIGNIFICANT CHANGE UP (ref 2–14)
NEUTROPHILS # BLD AUTO: 8.84 K/UL — HIGH (ref 1.8–7.4)
NEUTROPHILS NFR BLD AUTO: 80.2 % — HIGH (ref 43–77)
NRBC # FLD: 0 — SIGNIFICANT CHANGE UP
PLATELET # BLD AUTO: 162 K/UL — SIGNIFICANT CHANGE UP (ref 150–400)
PMV BLD: 10.5 FL — SIGNIFICANT CHANGE UP (ref 7–13)
RBC # BLD: 3.88 M/UL — SIGNIFICANT CHANGE UP (ref 3.8–5.2)
RBC # FLD: 14.4 % — SIGNIFICANT CHANGE UP (ref 10.3–14.5)
RH IG SCN BLD-IMP: NEGATIVE — SIGNIFICANT CHANGE UP
WBC # BLD: 11.03 K/UL — HIGH (ref 3.8–10.5)
WBC # FLD AUTO: 11.03 K/UL — HIGH (ref 3.8–10.5)

## 2018-07-16 RX ORDER — SODIUM CHLORIDE 9 MG/ML
1000 INJECTION, SOLUTION INTRAVENOUS ONCE
Qty: 0 | Refills: 0 | Status: COMPLETED | OUTPATIENT
Start: 2018-07-16 | End: 2018-07-16

## 2018-07-16 RX ORDER — OXYTOCIN 10 UNIT/ML
333.33 VIAL (ML) INJECTION
Qty: 20 | Refills: 0 | Status: DISCONTINUED | OUTPATIENT
Start: 2018-07-16 | End: 2018-07-17

## 2018-07-16 RX ORDER — FAMOTIDINE 10 MG/ML
20 INJECTION INTRAVENOUS ONCE
Qty: 0 | Refills: 0 | Status: COMPLETED | OUTPATIENT
Start: 2018-07-16 | End: 2018-07-16

## 2018-07-16 RX ORDER — SODIUM CHLORIDE 9 MG/ML
1000 INJECTION, SOLUTION INTRAVENOUS
Qty: 0 | Refills: 0 | Status: DISCONTINUED | OUTPATIENT
Start: 2018-07-16 | End: 2018-07-19

## 2018-07-16 RX ORDER — TETANUS TOXOID, REDUCED DIPHTHERIA TOXOID AND ACELLULAR PERTUSSIS VACCINE, ADSORBED 5; 2.5; 8; 8; 2.5 [IU]/.5ML; [IU]/.5ML; UG/.5ML; UG/.5ML; UG/.5ML
0.5 SUSPENSION INTRAMUSCULAR ONCE
Qty: 0 | Refills: 0 | Status: DISCONTINUED | OUTPATIENT
Start: 2018-07-16 | End: 2018-07-19

## 2018-07-16 RX ORDER — DOCUSATE SODIUM 100 MG
100 CAPSULE ORAL
Qty: 0 | Refills: 0 | Status: DISCONTINUED | OUTPATIENT
Start: 2018-07-16 | End: 2018-07-19

## 2018-07-16 RX ORDER — KETOROLAC TROMETHAMINE 30 MG/ML
30 SYRINGE (ML) INJECTION EVERY 6 HOURS
Qty: 0 | Refills: 0 | Status: DISCONTINUED | OUTPATIENT
Start: 2018-07-16 | End: 2018-07-17

## 2018-07-16 RX ORDER — OXYCODONE HYDROCHLORIDE 5 MG/1
5 TABLET ORAL
Qty: 0 | Refills: 0 | Status: DISCONTINUED | OUTPATIENT
Start: 2018-07-16 | End: 2018-07-16

## 2018-07-16 RX ORDER — OXYCODONE HYDROCHLORIDE 5 MG/1
1 TABLET ORAL
Qty: 0 | Refills: 0 | COMMUNITY

## 2018-07-16 RX ORDER — SODIUM CHLORIDE 9 MG/ML
1000 INJECTION, SOLUTION INTRAVENOUS
Qty: 0 | Refills: 0 | Status: DISCONTINUED | OUTPATIENT
Start: 2018-07-16 | End: 2018-07-16

## 2018-07-16 RX ORDER — OXYCODONE HYDROCHLORIDE 5 MG/1
5 TABLET ORAL
Qty: 0 | Refills: 0 | Status: DISCONTINUED | OUTPATIENT
Start: 2018-07-16 | End: 2018-07-19

## 2018-07-16 RX ORDER — DIPHENHYDRAMINE HCL 50 MG
25 CAPSULE ORAL EVERY 6 HOURS
Qty: 0 | Refills: 0 | Status: DISCONTINUED | OUTPATIENT
Start: 2018-07-16 | End: 2018-07-19

## 2018-07-16 RX ORDER — GLYCERIN ADULT
1 SUPPOSITORY, RECTAL RECTAL AT BEDTIME
Qty: 0 | Refills: 0 | Status: DISCONTINUED | OUTPATIENT
Start: 2018-07-16 | End: 2018-07-19

## 2018-07-16 RX ORDER — LANOLIN
1 OINTMENT (GRAM) TOPICAL
Qty: 0 | Refills: 0 | Status: DISCONTINUED | OUTPATIENT
Start: 2018-07-16 | End: 2018-07-19

## 2018-07-16 RX ORDER — OXYCODONE HYDROCHLORIDE 5 MG/1
5 TABLET ORAL EVERY 4 HOURS
Qty: 0 | Refills: 0 | Status: DISCONTINUED | OUTPATIENT
Start: 2018-07-16 | End: 2018-07-19

## 2018-07-16 RX ORDER — METOCLOPRAMIDE HCL 10 MG
10 TABLET ORAL ONCE
Qty: 0 | Refills: 0 | Status: COMPLETED | OUTPATIENT
Start: 2018-07-16 | End: 2018-07-16

## 2018-07-16 RX ORDER — FERROUS SULFATE 325(65) MG
325 TABLET ORAL DAILY
Qty: 0 | Refills: 0 | Status: DISCONTINUED | OUTPATIENT
Start: 2018-07-16 | End: 2018-07-19

## 2018-07-16 RX ORDER — ACETAMINOPHEN 500 MG
975 TABLET ORAL EVERY 6 HOURS
Qty: 0 | Refills: 0 | Status: DISCONTINUED | OUTPATIENT
Start: 2018-07-16 | End: 2018-07-19

## 2018-07-16 RX ORDER — CITRIC ACID/SODIUM CITRATE 300-500 MG
30 SOLUTION, ORAL ORAL ONCE
Qty: 0 | Refills: 0 | Status: COMPLETED | OUTPATIENT
Start: 2018-07-16 | End: 2018-07-16

## 2018-07-16 RX ORDER — SIMETHICONE 80 MG/1
80 TABLET, CHEWABLE ORAL EVERY 4 HOURS
Qty: 0 | Refills: 0 | Status: DISCONTINUED | OUTPATIENT
Start: 2018-07-16 | End: 2018-07-19

## 2018-07-16 RX ORDER — OXYTOCIN 10 UNIT/ML
41.67 VIAL (ML) INJECTION
Qty: 20 | Refills: 0 | Status: DISCONTINUED | OUTPATIENT
Start: 2018-07-16 | End: 2018-07-17

## 2018-07-16 RX ORDER — OXYTOCIN 10 UNIT/ML
41.67 VIAL (ML) INJECTION
Qty: 20 | Refills: 0 | Status: DISCONTINUED | OUTPATIENT
Start: 2018-07-16 | End: 2018-07-16

## 2018-07-16 RX ORDER — HEPARIN SODIUM 5000 [USP'U]/ML
5000 INJECTION INTRAVENOUS; SUBCUTANEOUS EVERY 12 HOURS
Qty: 0 | Refills: 0 | Status: DISCONTINUED | OUTPATIENT
Start: 2018-07-16 | End: 2018-07-19

## 2018-07-16 RX ORDER — SODIUM CHLORIDE 9 MG/ML
500 INJECTION, SOLUTION INTRAVENOUS ONCE
Qty: 0 | Refills: 0 | Status: COMPLETED | OUTPATIENT
Start: 2018-07-16 | End: 2018-07-16

## 2018-07-16 RX ORDER — IBUPROFEN 200 MG
600 TABLET ORAL EVERY 6 HOURS
Qty: 0 | Refills: 0 | Status: COMPLETED | OUTPATIENT
Start: 2018-07-16 | End: 2019-06-14

## 2018-07-16 RX ORDER — OXYTOCIN 10 UNIT/ML
333.33 VIAL (ML) INJECTION
Qty: 20 | Refills: 0 | Status: COMPLETED | OUTPATIENT
Start: 2018-07-16

## 2018-07-16 RX ADMIN — SODIUM CHLORIDE 2000 MILLILITER(S): 9 INJECTION, SOLUTION INTRAVENOUS at 16:23

## 2018-07-16 RX ADMIN — Medication 125 MILLIUNIT(S)/MIN: at 22:00

## 2018-07-16 RX ADMIN — Medication 975 MILLIGRAM(S): at 22:45

## 2018-07-16 RX ADMIN — SODIUM CHLORIDE 1000 MILLILITER(S): 9 INJECTION, SOLUTION INTRAVENOUS at 08:38

## 2018-07-16 RX ADMIN — FAMOTIDINE 20 MILLIGRAM(S): 10 INJECTION INTRAVENOUS at 16:22

## 2018-07-16 RX ADMIN — Medication 975 MILLIGRAM(S): at 21:56

## 2018-07-16 RX ADMIN — SIMETHICONE 80 MILLIGRAM(S): 80 TABLET, CHEWABLE ORAL at 22:35

## 2018-07-16 RX ADMIN — SODIUM CHLORIDE 250 MILLILITER(S): 9 INJECTION, SOLUTION INTRAVENOUS at 04:14

## 2018-07-16 RX ADMIN — HEPARIN SODIUM 5000 UNIT(S): 5000 INJECTION INTRAVENOUS; SUBCUTANEOUS at 21:56

## 2018-07-16 RX ADMIN — Medication 30 MILLILITER(S): at 16:22

## 2018-07-16 RX ADMIN — SODIUM CHLORIDE 2000 MILLILITER(S): 9 INJECTION, SOLUTION INTRAVENOUS at 02:45

## 2018-07-16 RX ADMIN — Medication 10 MILLIGRAM(S): at 16:23

## 2018-07-16 NOTE — DISCHARGE NOTE OB - CARE PROVIDER_API CALL
Gabriel Henry), Obstetrics and Gynecology  [address] [place] [address]  Phone: [phone]  Fax: [phone]

## 2018-07-16 NOTE — DISCHARGE NOTE OB - CARE PLAN
Principal Discharge DX:	 delivery delivered  Goal:	discharge 18  Assessment and plan of treatment:	follow up in 1 week

## 2018-07-16 NOTE — DISCHARGE NOTE OB - MEDICATION SUMMARY - MEDICATIONS TO TAKE

## 2018-07-16 NOTE — DISCHARGE NOTE OB - PATIENT PORTAL LINK FT
You can access the [facility] Portal, offered by [facility], by registering with the following website: [email or web address]

## 2018-07-17 LAB
BASOPHILS # BLD AUTO: 0.03 K/UL — SIGNIFICANT CHANGE UP (ref 0–0.2)
BASOPHILS NFR BLD AUTO: 0.2 % — SIGNIFICANT CHANGE UP (ref 0–2)
EOSINOPHIL # BLD AUTO: 0.06 K/UL — SIGNIFICANT CHANGE UP (ref 0–0.5)
EOSINOPHIL NFR BLD AUTO: 0.5 % — SIGNIFICANT CHANGE UP (ref 0–6)
HCT VFR BLD CALC: 32.6 % — LOW (ref 34.5–45)
HGB BLD-MCNC: 10.6 G/DL — LOW (ref 11.5–15.5)
IMM GRANULOCYTES # BLD AUTO: 0.08 # — SIGNIFICANT CHANGE UP
IMM GRANULOCYTES NFR BLD AUTO: 0.7 % — SIGNIFICANT CHANGE UP (ref 0–1.5)
LYMPHOCYTES # BLD AUTO: 1.25 K/UL — SIGNIFICANT CHANGE UP (ref 1–3.3)
LYMPHOCYTES # BLD AUTO: 10.3 % — LOW (ref 13–44)
MCHC RBC-ENTMCNC: 31.3 PG — SIGNIFICANT CHANGE UP (ref 27–34)
MCHC RBC-ENTMCNC: 32.5 % — SIGNIFICANT CHANGE UP (ref 32–36)
MCV RBC AUTO: 96.2 FL — SIGNIFICANT CHANGE UP (ref 80–100)
MONOCYTES # BLD AUTO: 0.67 K/UL — SIGNIFICANT CHANGE UP (ref 0–0.9)
MONOCYTES NFR BLD AUTO: 5.5 % — SIGNIFICANT CHANGE UP (ref 2–14)
NEUTROPHILS # BLD AUTO: 10.05 K/UL — HIGH (ref 1.8–7.4)
NEUTROPHILS NFR BLD AUTO: 82.8 % — HIGH (ref 43–77)
NRBC # FLD: 0 — SIGNIFICANT CHANGE UP
PLATELET # BLD AUTO: 140 K/UL — LOW (ref 150–400)
PMV BLD: 10.5 FL — SIGNIFICANT CHANGE UP (ref 7–13)
RBC # BLD: 3.39 M/UL — LOW (ref 3.8–5.2)
RBC # FLD: 14.4 % — SIGNIFICANT CHANGE UP (ref 10.3–14.5)
T PALLIDUM AB TITR SER: NEGATIVE — SIGNIFICANT CHANGE UP
WBC # BLD: 12.14 K/UL — HIGH (ref 3.8–10.5)
WBC # FLD AUTO: 12.14 K/UL — HIGH (ref 3.8–10.5)

## 2018-07-17 RX ORDER — IBUPROFEN 200 MG
600 TABLET ORAL EVERY 6 HOURS
Qty: 0 | Refills: 0 | Status: DISCONTINUED | OUTPATIENT
Start: 2018-07-17 | End: 2018-07-19

## 2018-07-17 RX ADMIN — Medication 975 MILLIGRAM(S): at 14:26

## 2018-07-17 RX ADMIN — Medication 30 MILLIGRAM(S): at 14:26

## 2018-07-17 RX ADMIN — Medication 30 MILLIGRAM(S): at 21:15

## 2018-07-17 RX ADMIN — Medication 30 MILLIGRAM(S): at 20:27

## 2018-07-17 RX ADMIN — Medication 325 MILLIGRAM(S): at 14:26

## 2018-07-17 RX ADMIN — Medication 975 MILLIGRAM(S): at 02:52

## 2018-07-17 RX ADMIN — SIMETHICONE 80 MILLIGRAM(S): 80 TABLET, CHEWABLE ORAL at 08:41

## 2018-07-17 RX ADMIN — Medication 975 MILLIGRAM(S): at 20:27

## 2018-07-17 RX ADMIN — HEPARIN SODIUM 5000 UNIT(S): 5000 INJECTION INTRAVENOUS; SUBCUTANEOUS at 10:08

## 2018-07-17 RX ADMIN — Medication 975 MILLIGRAM(S): at 09:07

## 2018-07-17 RX ADMIN — Medication 1 TABLET(S): at 14:26

## 2018-07-17 RX ADMIN — HEPARIN SODIUM 5000 UNIT(S): 5000 INJECTION INTRAVENOUS; SUBCUTANEOUS at 21:36

## 2018-07-17 RX ADMIN — Medication 30 MILLIGRAM(S): at 02:51

## 2018-07-17 RX ADMIN — SIMETHICONE 80 MILLIGRAM(S): 80 TABLET, CHEWABLE ORAL at 03:15

## 2018-07-17 RX ADMIN — SODIUM CHLORIDE 125 MILLILITER(S): 9 INJECTION, SOLUTION INTRAVENOUS at 02:16

## 2018-07-17 RX ADMIN — Medication 30 MILLIGRAM(S): at 09:08

## 2018-07-17 RX ADMIN — Medication 975 MILLIGRAM(S): at 08:32

## 2018-07-17 RX ADMIN — Medication 30 MILLIGRAM(S): at 03:45

## 2018-07-17 RX ADMIN — Medication 100 MILLIGRAM(S): at 21:36

## 2018-07-17 RX ADMIN — Medication 30 MILLIGRAM(S): at 08:32

## 2018-07-17 RX ADMIN — Medication 975 MILLIGRAM(S): at 21:15

## 2018-07-17 RX ADMIN — Medication 975 MILLIGRAM(S): at 03:45

## 2018-07-17 NOTE — PROGRESS NOTE ADULT - SUBJECTIVE AND OBJECTIVE BOX
OB Progress Note:  Delivery, POD#1    S: 31yo POD#1 s/p LTCS . Her pain is well controlled. She is tolerating a regular diet and passing flatus. Denies N/V. Denies CP/SOB/lightheadedness/dizziness.   She has been minimally ambulating in room without difficulty.      O:   Vital Signs Last 24 Hrs  T(C): 36.7 (2018 06:09), Max: 37.3 (2018 22:00)  T(F): 98 (2018 06:09), Max: 99.1 (2018 22:00)  HR: 88 (2018 06:09) (88 - 109)  BP: 108/70 (2018 06:09) (104/61 - 126/69)  BP(mean): 77 (2018 20:15) (65 - 85)  RR: 16 (2018 06:09) (14 - 23)  SpO2: 96% (2018 06:09) (96% - 98%)    Labs:  Blood type: O Negative  Rubella IgG: RPR: Negative                          10.6<L>   12.14<H> >-----------< 140<L>    (  @ 06:30 )             32.6<L>                        12.3   11.03<H> >-----------< 162    (  @ 02:30 )             36.7                  PE:  General: NAD  Abdomen: Mildly distended, appropriately tender, incision c/d/i.  Extremities: No erythema, no pitting edema

## 2018-07-17 NOTE — CONSULT NOTE ADULT - SUBJECTIVE AND OBJECTIVE BOX
Subjective:  The patient feels well.  She is ambulating.   She is tolerating regular diet.  She denies nausea and vomiting.  She is voiding.  Her pain is controlled.  She reports normal postpartum bleeding.      Objective:  Vital Signs Last 24 Hrs  T(C): 36.7 (2018 06:09), Max: 37.3 (2018 22:00)  T(F): 98 (2018 06:09), Max: 99.1 (2018 22:00)  HR: 88 (2018 06:09) (88 - 109)  BP: 108/70 (2018 06:09) (104/61 - 126/69)  BP(mean): 77 (2018 20:15) (65 - 85)  RR: 16 (2018 06:09) (14 - 23)  SpO2: 96% (2018 06:09) (96% - 98%)    Constitutional: NAD  Breast: Soft, nontender, not engorged.  Abdomen: Soft, nontender, no distension, firm uterine fundus  Incision: Clean, dry, and intact  Pelvic: Normal lochia noted  Ext: No calf tenderness bilaterally    LABS:                        10.6   12.14 )-----------( 140      ( 2018 06:30 )             32.6                         12.3   11.03 )-----------( 162      ( 2018 02:30 )             36.7         Allergies    No Known Allergies    Intolerances      MEDICATIONS  (STANDING):  acetaminophen   Tablet. 975 milliGRAM(s) Oral every 6 hours  diphtheria/tetanus/pertussis (acellular) Vaccine (ADAcel) 0.5 milliLiter(s) IntraMuscular once  ferrous    sulfate 325 milliGRAM(s) Oral daily  heparin  Injectable 5000 Unit(s) SubCutaneous every 12 hours  ibuprofen  Tablet 600 milliGRAM(s) Oral every 6 hours  ketorolac   Injectable 30 milliGRAM(s) IV Push every 6 hours  lactated ringers. 1000 milliLiter(s) (125 mL/Hr) IV Continuous <Continuous>  oxyCODONE    IR 5 milliGRAM(s) Oral every 3 hours  oxytocin Infusion 333.333 milliUNIT(s)/Min (1000 mL/Hr) IV Continuous <Continuous>  oxytocin Infusion 41.667 milliUNIT(s)/Min (125 mL/Hr) IV Continuous <Continuous>  oxytocin Infusion 333.333 milliUNIT(s)/Min (1000 mL/Hr) IV Continuous <Continuous>  prenatal multivitamin 1 Tablet(s) Oral daily    MEDICATIONS  (PRN):  diphenhydrAMINE   Capsule 25 milliGRAM(s) Oral every 6 hours PRN Itching  docusate sodium 100 milliGRAM(s) Oral two times a day PRN Stool Softening  glycerin Suppository - Adult 1 Suppository(s) Rectal at bedtime PRN Constipation  lanolin Ointment 1 Application(s) Topical every 3 hours PRN Sore Nipples  oxyCODONE    IR 5 milliGRAM(s) Oral every 3 hours PRN Mild Pain  oxyCODONE    IR 5 milliGRAM(s) Oral every 4 hours PRN Severe Pain (7 - 10)  simethicone 80 milliGRAM(s) Chew every 4 hours PRN Gas        Assessment and Plan  Pt stable POD # s/p1  section  -continues postoperative and postpartum care  -encourage ambulation

## 2018-07-17 NOTE — PROGRESS NOTE ADULT - PROBLEM SELECTOR PLAN 1
- Continue regular diet.  - Increase ambulation.  - Continue motrin, tylenol, oxycodone PRN for pain control.   - AM CBC stable     Kerline Nallely PGY-2

## 2018-07-17 NOTE — PROVIDER CONTACT NOTE (OTHER) - ACTION/TREATMENT ORDERED:
Per Anesthsia, pt can be transferred to PP unit prior to sign out.
NO new orders at present. Will continue to monitor.

## 2018-07-17 NOTE — PROGRESS NOTE ADULT - SUBJECTIVE AND OBJECTIVE BOX
INTERVAL HPI/OVERNIGHT EVENTS:  32y Female s/p c section under epidural anesthesia with duramorph for post op analgesia on     Vital Signs Last 24 Hrs  T(C): 36.7 (17 Jul 2018 06:09), Max: 37.3 (16 Jul 2018 22:00)  T(F): 98 (17 Jul 2018 06:09), Max: 99.1 (16 Jul 2018 22:00)  HR: 88 (17 Jul 2018 06:09) (88 - 109)  BP: 108/70 (17 Jul 2018 06:09) (104/61 - 126/69)  BP(mean): 77 (16 Jul 2018 20:15) (65 - 85)  RR: 16 (17 Jul 2018 06:09) (14 - 23)  SpO2: 96% (17 Jul 2018 06:09) (96% - 98%)    Patient seen 07:47, doing well, no anesthetic complications or complaints noted or reported.  Pain is controlled.

## 2018-07-17 NOTE — PROVIDER CONTACT NOTE (OTHER) - ASSESSMENT
VSS, afebrile.  Pt is oob to chair. Denies pain 0/10 on pain scale.  Incision is open to air with SS.

## 2018-07-18 RX ADMIN — Medication 600 MILLIGRAM(S): at 22:57

## 2018-07-18 RX ADMIN — Medication 975 MILLIGRAM(S): at 23:48

## 2018-07-18 RX ADMIN — SIMETHICONE 80 MILLIGRAM(S): 80 TABLET, CHEWABLE ORAL at 11:02

## 2018-07-18 RX ADMIN — Medication 600 MILLIGRAM(S): at 11:02

## 2018-07-18 RX ADMIN — Medication 975 MILLIGRAM(S): at 22:56

## 2018-07-18 RX ADMIN — Medication 975 MILLIGRAM(S): at 12:00

## 2018-07-18 RX ADMIN — Medication 100 MILLIGRAM(S): at 11:02

## 2018-07-18 RX ADMIN — Medication 975 MILLIGRAM(S): at 11:02

## 2018-07-18 RX ADMIN — Medication 975 MILLIGRAM(S): at 17:15

## 2018-07-18 RX ADMIN — Medication 600 MILLIGRAM(S): at 16:39

## 2018-07-18 RX ADMIN — Medication 975 MILLIGRAM(S): at 04:00

## 2018-07-18 RX ADMIN — Medication 975 MILLIGRAM(S): at 05:00

## 2018-07-18 RX ADMIN — Medication 1 TABLET(S): at 11:02

## 2018-07-18 RX ADMIN — Medication 975 MILLIGRAM(S): at 16:39

## 2018-07-18 RX ADMIN — Medication 600 MILLIGRAM(S): at 05:00

## 2018-07-18 RX ADMIN — HEPARIN SODIUM 5000 UNIT(S): 5000 INJECTION INTRAVENOUS; SUBCUTANEOUS at 22:56

## 2018-07-18 RX ADMIN — HEPARIN SODIUM 5000 UNIT(S): 5000 INJECTION INTRAVENOUS; SUBCUTANEOUS at 11:01

## 2018-07-18 RX ADMIN — Medication 600 MILLIGRAM(S): at 23:48

## 2018-07-18 RX ADMIN — Medication 100 MILLIGRAM(S): at 16:39

## 2018-07-18 RX ADMIN — Medication 600 MILLIGRAM(S): at 17:15

## 2018-07-18 RX ADMIN — Medication 600 MILLIGRAM(S): at 12:00

## 2018-07-18 RX ADMIN — Medication 600 MILLIGRAM(S): at 04:00

## 2018-07-18 RX ADMIN — Medication 325 MILLIGRAM(S): at 11:01

## 2018-07-18 NOTE — PROGRESS NOTE ADULT - SUBJECTIVE AND OBJECTIVE BOX
OB Progress Note: LTCS, POD#2    S: 33yo POD#2 s/p LTCS. Pain is well controlled. She is tolerating a regular diet and passing flatus. She is voiding spontaneously, and ambulating without difficulty. Denies CP/SOB. Denies lightheadedness/dizziness. Denies N/V.    O:  Vitals:  Vital Signs Last 24 Hrs  T(C): 36.6 (18 Jul 2018 06:19), Max: 37 (17 Jul 2018 22:49)  T(F): 97.8 (18 Jul 2018 06:19), Max: 98.6 (17 Jul 2018 22:49)  HR: 78 (18 Jul 2018 06:19) (78 - 104)  BP: 110/67 (18 Jul 2018 06:19) (96/61 - 110/67)  BP(mean): --  RR: 18 (18 Jul 2018 06:19) (17 - 18)  SpO2: 98% (18 Jul 2018 06:19) (97% - 99%)    MEDICATIONS  (STANDING):  acetaminophen   Tablet. 975 milliGRAM(s) Oral every 6 hours  diphtheria/tetanus/pertussis (acellular) Vaccine (ADAcel) 0.5 milliLiter(s) IntraMuscular once  ferrous    sulfate 325 milliGRAM(s) Oral daily  heparin  Injectable 5000 Unit(s) SubCutaneous every 12 hours  ibuprofen  Tablet 600 milliGRAM(s) Oral every 6 hours  lactated ringers. 1000 milliLiter(s) (125 mL/Hr) IV Continuous <Continuous>  oxyCODONE    IR 5 milliGRAM(s) Oral every 3 hours  prenatal multivitamin 1 Tablet(s) Oral daily      MEDICATIONS  (PRN):  diphenhydrAMINE   Capsule 25 milliGRAM(s) Oral every 6 hours PRN Itching  docusate sodium 100 milliGRAM(s) Oral two times a day PRN Stool Softening  glycerin Suppository - Adult 1 Suppository(s) Rectal at bedtime PRN Constipation  lanolin Ointment 1 Application(s) Topical every 3 hours PRN Sore Nipples  oxyCODONE    IR 5 milliGRAM(s) Oral every 4 hours PRN Severe Pain (7 - 10)  simethicone 80 milliGRAM(s) Chew every 4 hours PRN Gas      Labs:  Blood type: O Negative  Rubella IgG: RPR: Negative                          10.6<L>   12.14<H> >-----------< 140<L>    ( 07-17 @ 06:30 )             32.6<L>                        12.3   11.03<H> >-----------< 162    ( 07-16 @ 02:30 )             36.7                  PE:  General: NAD  Abdomen: Soft, appropriately tender, incision c/d/i.  Extremities: No erythema, no pitting edema

## 2018-07-18 NOTE — PROGRESS NOTE ADULT - PROBLEM SELECTOR PLAN 1
- Continue regular diet.  - Increase ambulation.  - Continue motrin, tylenol, oxycodone PRN for pain control.     Kerline Browning PGY-2

## 2018-07-18 NOTE — PROGRESS NOTE ADULT - SUBJECTIVE AND OBJECTIVE BOX
She is a  32y woman who is now post-operative day: 2    Subjective:  Pt without complaints.  Pain contolled.  +ambulating.  +void.    Tolerating regular diet.  No nausea/vomiting. Lochia WNL.    Objective:  Vital Signs Last 24 Hrs  T(C): 36.6 (2018 06:19), Max: 37 (2018 22:49)  T(F): 97.8 (2018 06:19), Max: 98.6 (2018 22:49)  HR: 78 (2018 06:19) (78 - 104)  BP: 110/67 (2018 06:19) (96/61 - 110/67)  BP(mean): --  RR: 18 (2018 06:19) (17 - 18)  SpO2: 98% (2018 06:19) (97% - 99%)    Constitutional: NAD  Breast: Soft, nontender, not engorged.  Abdomen: Soft, nontender, no distension.  Uterine fundus firm, non-tender.  Incision: Clean, dry, and intact  Ext: No calf tenderness bilaterally    LABS:                        10.6   12.14 )-----------( 140      ( 2018 06:30 )             32.6                         12.3   11.03 )-----------( 162      ( 2018 02:30 )             36.7         Allergies    No Known Allergies    Intolerances      MEDICATIONS  (STANDING):  acetaminophen   Tablet. 975 milliGRAM(s) Oral every 6 hours  diphtheria/tetanus/pertussis (acellular) Vaccine (ADAcel) 0.5 milliLiter(s) IntraMuscular once  ferrous    sulfate 325 milliGRAM(s) Oral daily  heparin  Injectable 5000 Unit(s) SubCutaneous every 12 hours  ibuprofen  Tablet 600 milliGRAM(s) Oral every 6 hours  lactated ringers. 1000 milliLiter(s) (125 mL/Hr) IV Continuous <Continuous>  oxyCODONE    IR 5 milliGRAM(s) Oral every 3 hours  prenatal multivitamin 1 Tablet(s) Oral daily    MEDICATIONS  (PRN):  diphenhydrAMINE   Capsule 25 milliGRAM(s) Oral every 6 hours PRN Itching  docusate sodium 100 milliGRAM(s) Oral two times a day PRN Stool Softening  glycerin Suppository - Adult 1 Suppository(s) Rectal at bedtime PRN Constipation  lanolin Ointment 1 Application(s) Topical every 3 hours PRN Sore Nipples  oxyCODONE    IR 5 milliGRAM(s) Oral every 4 hours PRN Severe Pain (7 - 10)  simethicone 80 milliGRAM(s) Chew every 4 hours PRN Gas        Assessment and Plan  Pt stable POD #2 s/p  section  -continue routine postoperative and postpartum care  -encourage ambulation  -analgesia prn  -discharge planned for tomorrow

## 2018-07-19 VITALS
SYSTOLIC BLOOD PRESSURE: 115 MMHG | RESPIRATION RATE: 18 BRPM | DIASTOLIC BLOOD PRESSURE: 76 MMHG | OXYGEN SATURATION: 98 % | HEART RATE: 76 BPM | TEMPERATURE: 98 F

## 2018-07-19 RX ORDER — ACETAMINOPHEN 500 MG
3 TABLET ORAL
Qty: 0 | Refills: 0 | COMMUNITY
Start: 2018-07-19

## 2018-07-19 RX ORDER — IBUPROFEN 200 MG
1 TABLET ORAL
Qty: 0 | Refills: 0 | COMMUNITY
Start: 2018-07-19

## 2018-07-19 RX ADMIN — Medication 975 MILLIGRAM(S): at 05:45

## 2018-07-19 RX ADMIN — Medication 975 MILLIGRAM(S): at 05:09

## 2018-07-19 RX ADMIN — Medication 600 MILLIGRAM(S): at 05:45

## 2018-07-19 RX ADMIN — Medication 600 MILLIGRAM(S): at 05:09

## 2018-07-19 NOTE — PROGRESS NOTE ADULT - SUBJECTIVE AND OBJECTIVE BOX
She is a  32y woman who is now post-operative day: 3    Subjective:  Pt without complaints.  Pain contolled.  +ambulating.  +void.    Tolerating regular diet.  No nausea/vomiting. Lochia WNL.    Objective:  Vital Signs Last 24 Hrs  T(C): 37.2 (2018 22:46), Max: 37.2 (2018 22:46)  T(F): 98.9 (2018 22:46), Max: 98.9 (2018 22:46)  HR: 80 (2018 22:46) (78 - 82)  BP: 104/70 (2018 22:46) (104/70 - 120/76)  BP(mean): --  RR: 16 (2018 22:46) (16 - 18)  SpO2: 99% (2018 22:46) (98% - 99%)    Constitutional: NAD  Breast: Soft, nontender, not engorged.  Abdomen: Soft, nontender, no distension.  Uterine fundus firm, non-tender.  Incision: Clean, dry, and intact  Ext: No calf tenderness bilaterally    LABS:                        10.6   12.14 )-----------( 140      ( 2018 06:30 )             32.6                         12.3   11.03 )-----------( 162      ( 2018 02:30 )             36.7         Allergies    No Known Allergies    Intolerances      MEDICATIONS  (STANDING):  acetaminophen   Tablet. 975 milliGRAM(s) Oral every 6 hours  diphtheria/tetanus/pertussis (acellular) Vaccine (ADAcel) 0.5 milliLiter(s) IntraMuscular once  ferrous    sulfate 325 milliGRAM(s) Oral daily  heparin  Injectable 5000 Unit(s) SubCutaneous every 12 hours  ibuprofen  Tablet 600 milliGRAM(s) Oral every 6 hours  lactated ringers. 1000 milliLiter(s) (125 mL/Hr) IV Continuous <Continuous>  oxyCODONE    IR 5 milliGRAM(s) Oral every 3 hours  prenatal multivitamin 1 Tablet(s) Oral daily    MEDICATIONS  (PRN):  diphenhydrAMINE   Capsule 25 milliGRAM(s) Oral every 6 hours PRN Itching  docusate sodium 100 milliGRAM(s) Oral two times a day PRN Stool Softening  glycerin Suppository - Adult 1 Suppository(s) Rectal at bedtime PRN Constipation  lanolin Ointment 1 Application(s) Topical every 3 hours PRN Sore Nipples  oxyCODONE    IR 5 milliGRAM(s) Oral every 4 hours PRN Severe Pain (7 - 10)  simethicone 80 milliGRAM(s) Chew every 4 hours PRN Gas        Assessment and Plan  Pt stable POD #3 s/p  section  -continue routine postoperative and postpartum care  -encourage ambulation  -analgesia prn  -discharge home  -advised

## 2019-05-02 NOTE — PATIENT PROFILE OB - NS PRO CONTRA FLU 1
out of season (available sept 1 thru apr 2 only) transfer training/balance training/bed mobility training/strengthening/gait training

## 2019-07-30 ENCOUNTER — RESULT REVIEW (OUTPATIENT)
Age: 34
End: 2019-07-30

## 2020-06-16 DIAGNOSIS — E55.9 VITAMIN D DEFICIENCY, UNSPECIFIED: ICD-10-CM

## 2020-06-20 ENCOUNTER — LABORATORY RESULT (OUTPATIENT)
Age: 35
End: 2020-06-20

## 2020-06-22 LAB
T3 SERPL-MCNC: 121 NG/DL
T4 SERPL-MCNC: 7.5 UG/DL
TSH SERPL-ACNC: 3.5 UIU/ML

## 2020-06-30 ENCOUNTER — APPOINTMENT (OUTPATIENT)
Dept: CARDIOLOGY | Facility: CLINIC | Age: 35
End: 2020-06-30
Payer: COMMERCIAL

## 2020-06-30 ENCOUNTER — NON-APPOINTMENT (OUTPATIENT)
Age: 35
End: 2020-06-30

## 2020-06-30 VITALS
HEART RATE: 96 BPM | OXYGEN SATURATION: 100 % | SYSTOLIC BLOOD PRESSURE: 118 MMHG | DIASTOLIC BLOOD PRESSURE: 90 MMHG | WEIGHT: 169 LBS | TEMPERATURE: 97.6 F | BODY MASS INDEX: 28.12 KG/M2

## 2020-06-30 PROCEDURE — 99395 PREV VISIT EST AGE 18-39: CPT

## 2021-09-07 ENCOUNTER — TRANSCRIPTION ENCOUNTER (OUTPATIENT)
Age: 36
End: 2021-09-07

## 2021-09-25 NOTE — ASU PATIENT PROFILE, ADULT - NS SC CAGE ALCOHOL GUILTY ABOUT
Writer unable to perform safety assessment on pt this shift, as pt sleeping the entire shift.  Staff attempted to wake pt up for meals, however, pt did not get up, or respond to staff.  Pt sleeping in quiet room, which is being monitored by staff.  Per , pt to remain on room lockout and is to use quiet room bathroom.  The expectation set by the  Is that pt attends and participates in groups/treatment.  CTM.   no

## 2021-10-19 ENCOUNTER — TRANSCRIPTION ENCOUNTER (OUTPATIENT)
Age: 36
End: 2021-10-19

## 2021-10-19 ENCOUNTER — FORM ENCOUNTER (OUTPATIENT)
Age: 36
End: 2021-10-19

## 2021-10-20 ENCOUNTER — TRANSCRIPTION ENCOUNTER (OUTPATIENT)
Age: 36
End: 2021-10-20

## 2022-02-18 NOTE — ASU PREOP CHECKLIST - AS BP NONINV METHOD
Pt continues to struggle with med compliance, pt refuses at this time and is planned to go for court hearing tx over obj SW provided psycho-education and encouragement to pt for tx compliance electronic SW provides psycho-education about tx compliance to pt regularly as he continues to struggle with compliance while on the unit as per staff, pt impulsively refuses meds as times and is fine other times, pt continues to need encouragement with taking meds SW provided psycho-education and encouragement to pt for tx compliance

## 2022-04-15 LAB
25(OH)D3 SERPL-MCNC: 18.4 NG/ML
ALBUMIN SERPL ELPH-MCNC: 4.8 G/DL
ALP BLD-CCNC: 52 U/L
ALT SERPL-CCNC: 11 U/L
ANION GAP SERPL CALC-SCNC: 11 MMOL/L
AST SERPL-CCNC: 12 U/L
BASOPHILS # BLD AUTO: 0.03 K/UL
BASOPHILS NFR BLD AUTO: 0.5 %
BILIRUB SERPL-MCNC: 0.4 MG/DL
BUN SERPL-MCNC: 10 MG/DL
CALCIUM SERPL-MCNC: 9.6 MG/DL
CHLORIDE SERPL-SCNC: 107 MMOL/L
CHOLEST SERPL-MCNC: 204 MG/DL
CO2 SERPL-SCNC: 24 MMOL/L
CREAT SERPL-MCNC: 0.59 MG/DL
EGFR: 120 ML/MIN/1.73M2
EOSINOPHIL # BLD AUTO: 0.07 K/UL
EOSINOPHIL NFR BLD AUTO: 1.1 %
ESTIMATED AVERAGE GLUCOSE: 94 MG/DL
GLUCOSE SERPL-MCNC: 102 MG/DL
HBA1C MFR BLD HPLC: 4.9 %
HCT VFR BLD CALC: 44.2 %
HDLC SERPL-MCNC: 74 MG/DL
HGB BLD-MCNC: 13.9 G/DL
IMM GRANULOCYTES NFR BLD AUTO: 0.2 %
LDLC SERPL CALC-MCNC: 122 MG/DL
LYMPHOCYTES # BLD AUTO: 1.77 K/UL
LYMPHOCYTES NFR BLD AUTO: 27 %
MAN DIFF?: NORMAL
MCHC RBC-ENTMCNC: 29.8 PG
MCHC RBC-ENTMCNC: 31.4 GM/DL
MCV RBC AUTO: 94.6 FL
MONOCYTES # BLD AUTO: 0.38 K/UL
MONOCYTES NFR BLD AUTO: 5.8 %
NEUTROPHILS # BLD AUTO: 4.3 K/UL
NEUTROPHILS NFR BLD AUTO: 65.4 %
NONHDLC SERPL-MCNC: 131 MG/DL
PLATELET # BLD AUTO: 251 K/UL
POTASSIUM SERPL-SCNC: 4.7 MMOL/L
PROT SERPL-MCNC: 7.1 G/DL
RBC # BLD: 4.67 M/UL
RBC # FLD: 13.7 %
SODIUM SERPL-SCNC: 142 MMOL/L
T4 SERPL-MCNC: 7.6 UG/DL
TRIGL SERPL-MCNC: 44 MG/DL
TSH SERPL-ACNC: 1.97 UIU/ML
WBC # FLD AUTO: 6.56 K/UL

## 2022-04-18 ENCOUNTER — NON-APPOINTMENT (OUTPATIENT)
Age: 37
End: 2022-04-18

## 2022-04-18 ENCOUNTER — APPOINTMENT (OUTPATIENT)
Dept: CARDIOLOGY | Facility: CLINIC | Age: 37
End: 2022-04-18
Payer: COMMERCIAL

## 2022-04-18 VITALS
OXYGEN SATURATION: 100 % | DIASTOLIC BLOOD PRESSURE: 96 MMHG | BODY MASS INDEX: 27.79 KG/M2 | WEIGHT: 167 LBS | SYSTOLIC BLOOD PRESSURE: 130 MMHG | HEART RATE: 86 BPM

## 2022-04-18 DIAGNOSIS — Z00.00 ENCOUNTER FOR GENERAL ADULT MEDICAL EXAMINATION W/OUT ABNORMAL FINDINGS: ICD-10-CM

## 2022-04-18 PROCEDURE — 93000 ELECTROCARDIOGRAM COMPLETE: CPT

## 2022-04-18 PROCEDURE — 99214 OFFICE O/P EST MOD 30 MIN: CPT

## 2022-10-25 ENCOUNTER — NON-APPOINTMENT (OUTPATIENT)
Age: 37
End: 2022-10-25

## 2022-10-27 ENCOUNTER — APPOINTMENT (OUTPATIENT)
Dept: CARDIOLOGY | Facility: CLINIC | Age: 37
End: 2022-10-27

## 2022-10-28 ENCOUNTER — NON-APPOINTMENT (OUTPATIENT)
Age: 37
End: 2022-10-28

## 2023-02-01 ENCOUNTER — NON-APPOINTMENT (OUTPATIENT)
Age: 38
End: 2023-02-01

## 2023-10-05 ENCOUNTER — NON-APPOINTMENT (OUTPATIENT)
Age: 38
End: 2023-10-05

## 2023-10-14 ENCOUNTER — NON-APPOINTMENT (OUTPATIENT)
Age: 38
End: 2023-10-14

## 2024-12-19 ENCOUNTER — NON-APPOINTMENT (OUTPATIENT)
Age: 39
End: 2024-12-19

## 2025-01-13 ENCOUNTER — NON-APPOINTMENT (OUTPATIENT)
Age: 40
End: 2025-01-13